# Patient Record
Sex: MALE | Race: WHITE | NOT HISPANIC OR LATINO | URBAN - METROPOLITAN AREA
[De-identification: names, ages, dates, MRNs, and addresses within clinical notes are randomized per-mention and may not be internally consistent; named-entity substitution may affect disease eponyms.]

---

## 2020-10-08 ENCOUNTER — EMERGENCY (EMERGENCY)
Facility: HOSPITAL | Age: 35
LOS: 1 days | Discharge: HOME | End: 2020-10-08
Attending: EMERGENCY MEDICINE | Admitting: EMERGENCY MEDICINE
Payer: MEDICAID

## 2020-10-08 VITALS
TEMPERATURE: 98 F | RESPIRATION RATE: 14 BRPM | HEART RATE: 106 BPM | SYSTOLIC BLOOD PRESSURE: 125 MMHG | DIASTOLIC BLOOD PRESSURE: 75 MMHG | OXYGEN SATURATION: 100 %

## 2020-10-08 LAB
ALBUMIN SERPL ELPH-MCNC: 4.2 G/DL — SIGNIFICANT CHANGE UP (ref 3.3–5)
ALP SERPL-CCNC: 92 U/L — SIGNIFICANT CHANGE UP (ref 40–120)
ALT FLD-CCNC: 44 U/L — HIGH (ref 4–41)
ANION GAP SERPL CALC-SCNC: 10 MMO/L — SIGNIFICANT CHANGE UP (ref 7–14)
AST SERPL-CCNC: 23 U/L — SIGNIFICANT CHANGE UP (ref 4–40)
BASOPHILS # BLD AUTO: 0.03 K/UL — SIGNIFICANT CHANGE UP (ref 0–0.2)
BASOPHILS NFR BLD AUTO: 0.3 % — SIGNIFICANT CHANGE UP (ref 0–2)
BILIRUB SERPL-MCNC: 0.6 MG/DL — SIGNIFICANT CHANGE UP (ref 0.2–1.2)
BUN SERPL-MCNC: 11 MG/DL — SIGNIFICANT CHANGE UP (ref 7–23)
CALCIUM SERPL-MCNC: 9.6 MG/DL — SIGNIFICANT CHANGE UP (ref 8.4–10.5)
CHLORIDE SERPL-SCNC: 101 MMOL/L — SIGNIFICANT CHANGE UP (ref 98–107)
CO2 SERPL-SCNC: 26 MMOL/L — SIGNIFICANT CHANGE UP (ref 22–31)
CREAT SERPL-MCNC: 0.74 MG/DL — SIGNIFICANT CHANGE UP (ref 0.5–1.3)
EOSINOPHIL # BLD AUTO: 0.4 K/UL — SIGNIFICANT CHANGE UP (ref 0–0.5)
EOSINOPHIL NFR BLD AUTO: 4.1 % — SIGNIFICANT CHANGE UP (ref 0–6)
GLUCOSE SERPL-MCNC: 98 MG/DL — SIGNIFICANT CHANGE UP (ref 70–99)
HCT VFR BLD CALC: 38.3 % — LOW (ref 39–50)
HGB BLD-MCNC: 12.3 G/DL — LOW (ref 13–17)
IMM GRANULOCYTES NFR BLD AUTO: 0.8 % — SIGNIFICANT CHANGE UP (ref 0–1.5)
LYMPHOCYTES # BLD AUTO: 2.25 K/UL — SIGNIFICANT CHANGE UP (ref 1–3.3)
LYMPHOCYTES # BLD AUTO: 23.1 % — SIGNIFICANT CHANGE UP (ref 13–44)
MCHC RBC-ENTMCNC: 28.5 PG — SIGNIFICANT CHANGE UP (ref 27–34)
MCHC RBC-ENTMCNC: 32.1 % — SIGNIFICANT CHANGE UP (ref 32–36)
MCV RBC AUTO: 88.7 FL — SIGNIFICANT CHANGE UP (ref 80–100)
MONOCYTES # BLD AUTO: 0.89 K/UL — SIGNIFICANT CHANGE UP (ref 0–0.9)
MONOCYTES NFR BLD AUTO: 9.1 % — SIGNIFICANT CHANGE UP (ref 2–14)
NEUTROPHILS # BLD AUTO: 6.1 K/UL — SIGNIFICANT CHANGE UP (ref 1.8–7.4)
NEUTROPHILS NFR BLD AUTO: 62.6 % — SIGNIFICANT CHANGE UP (ref 43–77)
NRBC # FLD: 0 K/UL — SIGNIFICANT CHANGE UP (ref 0–0)
PLATELET # BLD AUTO: 251 K/UL — SIGNIFICANT CHANGE UP (ref 150–400)
PMV BLD: 11.3 FL — SIGNIFICANT CHANGE UP (ref 7–13)
POTASSIUM SERPL-MCNC: 3.8 MMOL/L — SIGNIFICANT CHANGE UP (ref 3.5–5.3)
POTASSIUM SERPL-SCNC: 3.8 MMOL/L — SIGNIFICANT CHANGE UP (ref 3.5–5.3)
PROT SERPL-MCNC: 8 G/DL — SIGNIFICANT CHANGE UP (ref 6–8.3)
RBC # BLD: 4.32 M/UL — SIGNIFICANT CHANGE UP (ref 4.2–5.8)
RBC # FLD: 12.6 % — SIGNIFICANT CHANGE UP (ref 10.3–14.5)
SARS-COV-2 RNA SPEC QL NAA+PROBE: SIGNIFICANT CHANGE UP
SODIUM SERPL-SCNC: 137 MMOL/L — SIGNIFICANT CHANGE UP (ref 135–145)
T3 SERPL-MCNC: 162.7 NG/DL — SIGNIFICANT CHANGE UP (ref 80–200)
T4 AB SER-ACNC: 12.18 UG/DL — SIGNIFICANT CHANGE UP (ref 5.1–13)
T4 FREE SERPL-MCNC: 3 NG/DL — HIGH (ref 0.9–1.8)
TSH SERPL-MCNC: < 0.1 UIU/ML — LOW (ref 0.27–4.2)
WBC # BLD: 9.75 K/UL — SIGNIFICANT CHANGE UP (ref 3.8–10.5)
WBC # FLD AUTO: 9.75 K/UL — SIGNIFICANT CHANGE UP (ref 3.8–10.5)

## 2020-10-08 PROCEDURE — 99219: CPT

## 2020-10-08 PROCEDURE — 76536 US EXAM OF HEAD AND NECK: CPT | Mod: 26

## 2020-10-08 PROCEDURE — 71046 X-RAY EXAM CHEST 2 VIEWS: CPT | Mod: 26

## 2020-10-08 PROCEDURE — 99218: CPT

## 2020-10-08 RX ORDER — KETOROLAC TROMETHAMINE 30 MG/ML
30 SYRINGE (ML) INJECTION EVERY 6 HOURS
Refills: 0 | Status: DISCONTINUED | OUTPATIENT
Start: 2020-10-08 | End: 2020-10-09

## 2020-10-08 RX ORDER — KETOROLAC TROMETHAMINE 30 MG/ML
15 SYRINGE (ML) INJECTION ONCE
Refills: 0 | Status: DISCONTINUED | OUTPATIENT
Start: 2020-10-08 | End: 2020-10-08

## 2020-10-08 RX ADMIN — Medication 15 MILLIGRAM(S): at 17:01

## 2020-10-08 NOTE — ED CDU PROVIDER INITIAL DAY NOTE - ATTENDING CONTRIBUTION TO CARE
Seen and examined, have discussed plan of care with PA.   I agree with note as stated above, having amended the EMR as needed to reflect the findings. Likely thyroiditis, tx sx, await further lab testing, monitor HR, consider methimazole for overnight sx, will discuss with Endocrine.

## 2020-10-08 NOTE — ED CDU PROVIDER INITIAL DAY NOTE - MEDICAL DECISION MAKING DETAILS
34 y/o male c/o right side throat pain, ttp and right thyroid nodule on US  -concern for thyroiditis vs malignancy etc  -labs, observation, pain control  -endocrinology evaluation

## 2020-10-08 NOTE — ED ADULT TRIAGE NOTE - CHIEF COMPLAINT QUOTE
Pt presents to ED for throat pain since yesterday, pt states one month ago in [facility] he was told he had 2 tumors in his throat and yesterday he was diagnosed with 4 tumors in his throat. C/o pain when swallowing, no difficulty breathing  or SOB at this time, airway patent, able to speak in full complete sentences w/o difficulty, SpO2 100% at this time. Denies pmhx.

## 2020-10-08 NOTE — ED CDU PROVIDER INITIAL DAY NOTE - OBJECTIVE STATEMENT
Pt is a 36 y/o M no PMHx p/w neck pain x 2 months.  Pt notes constant bilateral anterior neck pain, sharp, which worsens with swallowing and palpation. He states he had sono of thyroid 1-2 months ago in [facility] and was told he had two nodules at thyroid, for which pt states he was treated with antibiotics and steroids.  He notes when he took steroids, pain would improve.  Yesterday he had thyroid sono through PCP Dr. Hays.  He was told by the Catapultero tech that he may have four nodules.  He presents to ED today due to continued pain.  Pt states he is able to swallow liquids and solids, but with pain.  Pt denies any fevers, chills, chest pain, SOB, palpitations, headache.    CDU Note: Agree with above: 36 y/o male w ?hx thyroid infection/nodule diagnosed in [facility] months ago now c/o worsening pain to right side of neck. In ER had US showin luis thyroid nodule 1.9 cm and tsh <0.10 (t3/t4 wnl) - sent to CDU for pain control, observation and endocrinology evaluation am. Pt. resting comfortably.

## 2020-10-08 NOTE — ED CDU PROVIDER INITIAL DAY NOTE - DETAILS
36 y/o male c/o right side throat pain, ttp and right thyroid nodule on US  -concern for thyroiditis vs malignancy etc  -labs, observation, pain control  -endocrinology evaluation

## 2020-10-08 NOTE — ED PROVIDER NOTE - OBJECTIVE STATEMENT
Pt is a 36 y/o M no PMHx p/w neck pain x 2 months.  Pt notes constant bilateral anterior neck pain, sharp, which worsens with swallowing and palpation. He states he had sono of thyroid 1-2 months ago in [facility] and was told he had two nodules at thyroid, for which pt states he was treated with antibiotics and steroids.  He notes when he took steroids, pain would improve.  Yesterday he had thyroid sono through PCP Dr. Hays.  He was told by the [employer] tech that he may have four nodules.  He presents to ED today due to continued pain.  Pt states he is able to swallow liquids and solids, but with pain.  Pt denies any fevers, chills, chest pain, SOB, palpitations, headache.

## 2020-10-08 NOTE — CHART NOTE - NSCHARTNOTEFT_GEN_A_CORE
34 y/o M w/ hx of ?thyroid infection/inflammation ?months ago. Poor historian according to team. Now presenting with throat pain. Thyroid US reveals   TSH <0.1, TT3 162, TT4 12.18.   HR 97, rest of vitals wnl.   < from: US Thyroid (10.08.20 @ 15:09) >    The thyroid gland is mildly heterogeneous in echogenicity.    Right Lobe: Enlarged measuring 4.1 x 2.2 x 2.1 cm. Solid isoechoic ill-defined noncalcified nodule in the mid to lower pole measures 1.6 x 1.6 x 1.9 cm.    Left Lobe: 2.9 x 1.3 x 0.9 cm. no discrete thyroid nodules are identified.    Isthmus: 4 mm.    Cervical Lymph Nodes: No enlarged or abnormal morphology cervical nodes.    IMPRESSION:    Solid right thyroid nodule measuring up to 1.9 cm.    TI-RAD 3: Mildly suspicious (FNA if > 2.5 cm, Follow if > 1.5 cm)    Suspect recovering from thyroiditis and currently euthyroid given history or subclinical hyperthyroidism from toxic nodule/graves' disease. TSH takes up to 4-6 weeks to full equilibrate while T3 and T4 can equilibrate in a week.  -please check FT4, TSI, TSH receptor ab, TPO ab and thyroglobulin   -would need uptake scan outpatient if antibodies are negative   -would not start methimazole or beta blockers at this point in time but may reconsider if FT4 is elevated or HR is elevated.

## 2020-10-08 NOTE — ED PROVIDER NOTE - ATTENDING CONTRIBUTION TO CARE
yumiko: 36 yo man co painful neck swelling which on exam is a goiter, has nodule on sono.  pt otherwise pleasant, alert and interactive. the thyroid is diffusely swollen and tender. pain has been relieved by intermittent steroids.   pt would benefit from cdu for observation and endocrine eval tomorrow    I performed a history and physical exam of the patient and discussed their management with the resident and /or advanced care provider. I reviewed the resident and /or ACP's note and agree with the documented findings and plan of care. My medical decison making and observations are found above.

## 2020-10-08 NOTE — ED ADULT NURSE NOTE - CHIEF COMPLAINT QUOTE
Pt presents to ED for throat pain since yesterday, pt states one month ago in [facility] he was told he had 2 tumors in his throat and yesterday he was diagnosed with 4 tumors in his throat. C/o pain when swallowing, no difficulty breathing  or SOB at this time, airway patent, able to speak in full complete sentences w/o difficulty, SpO2 100% at this time. Denies pmhx.

## 2020-10-08 NOTE — ED PROVIDER NOTE - CLINICAL SUMMARY MEDICAL DECISION MAKING FREE TEXT BOX
Pt is a 36 y/o M no PMHx p/w neck pain x 2 months -- possible thyroiditis, no e/o tonsillitis or PTA -- labs, thyroid panel Pt is a 34 y/o M no PMHx p/w neck pain x 2 months -- possible thyroiditis, no e/o tonsillitis or PTA -- labs, thyroid panel    maryellenkaity: 34 yo man co painful neck swelling which on exam is a goiter, has nodule on sono.  pt otherwise pleasant, alert and interactive. the thyroid is diffusely swollen and tender. pain has been relieved by intermittent steroids.   pt would benefit from cdu for observation and endocrine eval tomorrow

## 2020-10-09 VITALS
RESPIRATION RATE: 16 BRPM | OXYGEN SATURATION: 100 % | HEART RATE: 87 BPM | SYSTOLIC BLOOD PRESSURE: 111 MMHG | DIASTOLIC BLOOD PRESSURE: 71 MMHG | TEMPERATURE: 99 F

## 2020-10-09 DIAGNOSIS — E04.1 NONTOXIC SINGLE THYROID NODULE: ICD-10-CM

## 2020-10-09 LAB — THYROPEROXIDASE AB SERPL-ACNC: 21.5 IU/ML — SIGNIFICANT CHANGE UP

## 2020-10-09 PROCEDURE — 99217: CPT

## 2020-10-09 RX ORDER — ATENOLOL 25 MG/1
0.5 TABLET ORAL
Qty: 15 | Refills: 0
Start: 2020-10-09 | End: 2020-11-07

## 2020-10-09 RX ADMIN — Medication 30 MILLIGRAM(S): at 10:30

## 2020-10-09 RX ADMIN — Medication 30 MILLIGRAM(S): at 09:40

## 2020-10-09 NOTE — ED CDU PROVIDER DISPOSITION NOTE - CLINICAL COURSE
36 yo male, no stated PMH other than recently diagnosed "thyroid nodules" in [place], presented to the ED c/o bilateral anterior neck pain, worsened with swallowing and direct palpation, x 2 months. Evaluated by Endocrinology recommend outpatient work up, short course of medrol dose sharon and atenol. PT agreeable to plan. Will follow up with clinic in 2 weeks. Return precautions provided.

## 2020-10-09 NOTE — ED CDU PROVIDER SUBSEQUENT DAY NOTE - ATTENDING CONTRIBUTION TO CARE
I performed a history and physical exam of the patient and discussed their management with the PA. I reviewed the PA's note and agree with the documented findings and plan of care. I have edited as appropriate. My medical decision making and observations are found above.  NAD, normal speech, ate breakfast w/o issue, no airway compromise

## 2020-10-09 NOTE — CONSULT NOTE ADULT - PROBLEM SELECTOR RECOMMENDATION 2
-Will have to rule out toxic nodule outpatient with I-131 uptake and scan   -If negative, will schedule for FNA of the right mid pole solid, isoechoic nodule with a thick halo 1.9 cm meets CORI critieria for biopsy

## 2020-10-09 NOTE — CONSULT NOTE ADULT - SUBJECTIVE AND OBJECTIVE BOX
HPI:  35 year old male with history of neck pain x 2 months.   Patient reported that 2 months ago he was in [facility]. He notices that he had constant anterior neck pain, sharp and worsened with swallowing and palpation. He saw multiple providers in [facility] and was given antibiotics and steroids ( multiple times). He also had a neck ultrasound there and was found to have bilateral thyroid nodules.   He was seen yesterday by his PCP Dr. Hays, who also ordered an ultrasound showing 4 nodules. Subsequently he came to the ED because he has been having continued pain.   He denied having heart palpitations, tremors. He reported some weight loss.   Found to have a TSH of 0.1 and FT4 of 3.0    Thyroid ultrasound showing:   The thyroid gland is mildly heterogeneous in echogenicity.   Right Lobe: Enlarged measuring 4.1 x 2.2 x 2.1 cm. Solid isoechoic ill-defined noncalcified nodule in the mid to lower pole measures 1.6 x 1.6 x 1.9 cm.   Left Lobe: 2.9 x 1.3 x 0.9 cm. No discrete thyroid nodules are identified.   Isthmus: 4 mm.   Cervical Lymph Nodes: No enlarged or abnormal morphology cervical nodes.   IMPRESSION:     Solid right thyroid nodule measuring up to 1.9 cm.         PAST MEDICAL & SURGICAL HISTORY:  Thyroid nodule    No significant past surgical history        FAMILY HISTORY:  No pertinent family history in first degree relatives        SOCIAL HISTORY:  Smoking:None   ETOH:None       MEDICATIONS  (PRN):  ketorolac   Injectable 30 milliGRAM(s) IV Push every 6 hours PRN mild to moderate pain      Allergies  No Known Allergies          REVIEW OF SYSTEMS  CONSTITUTIONAL:  Negative fever or chills  EYES:  Negative visual field deficit, blurry vision or double vision  ENT:  + sore throat +Tenderness on swallowing   CARDIOVASCULAR:  Negative for chest pain or palpitations  RESPIRATORY:  Negative for cough, wheezing, sputum production, or SOB   GASTROINTESTINAL:  Negative for nausea, vomiting, diarrhea, or abdominal pain  GENITOURINARY:  Negative frequency, urgency or dysuria  MUSCULOSKELETAL:  No joint pain or stiffness  INTEGUMENTARY: no rashes  NEUROLOGIC:  No headache, confusion, syncope or seizures  PSYCHIATRIC: No depression or anxiety          LABS:                        12.3   9.75  )-----------( 251      ( 08 Oct 2020 11:17 )             38.3     10-08    137  |  101  |  11  ----------------------------<  98  3.8   |  26  |  0.74    Ca    9.6      08 Oct 2020 11:17    TPro  8.0  /  Alb  4.2  /  TBili  0.6  /  DBili  x   /  AST  23  /  ALT  44<H>  /  AlkPhos  92  10-08          RADIOLOGY & ADDITIONAL STUDIES:      Physical Examination:  Vital Signs Last 24 Hrs  T(C): 37 (09 Oct 2020 10:21), Max: 37.1 (08 Oct 2020 19:15)  T(F): 98.6 (09 Oct 2020 10:21), Max: 98.7 (08 Oct 2020 19:15)  HR: 87 (09 Oct 2020 10:21) (85 - 97)  BP: 111/71 (09 Oct 2020 10:21) (105/71 - 116/72)  BP(mean): --  RR: 16 (09 Oct 2020 10:21) (16 - 16)  SpO2: 100% (09 Oct 2020 10:21) (100% - 100%)    Constitutional: wn/wd in NAD.   HEENT: NCAT, MMM  Neck: +Boggy thyroid with pain on palpation   Respiratory: lungs CTAB.  Cardiovascular: S1, S2, mild tachycardia   GI: soft, NT/ND, no masses/HSM appreciated.  Neurology: no tremors.   Skin: no visible rashes/lesions  Psychiatric: AAO x 3, normal affect/mood.

## 2020-10-09 NOTE — ED CDU PROVIDER SUBSEQUENT DAY NOTE - HISTORY
36 yo female, no stated PMH other than recently diagnosed "thyroid nodules" in [place], presented to the ED c/o bilateral anterior neck pain, worsened with swallowing and direct palpation, x 2 months.  Pt had outpatient thyroid US by his PMD 2 days ago which reportedly showed multiple nodules; pt was having pain, so presented to the ED for further evaluation.    In the ED, VSS; WBC 9.75, Hb 12.3, CMP: ALT 44; CMP otherwise unremarkable.  TSH <0.10, T3 total: 162.7, T4: 12.18, free thyroxine: 3.  Endocrine was consulted; will evaluate pt 10/9 daytime.  Suspect thyroiditis, pt was dispo'd to CDU for continued care plan:  Supportive care, Endocrine consult, general observation care / monitoring.  In the interim, pt objectively noted to be resting comfortably; no issues thus far.

## 2020-10-09 NOTE — ED CDU PROVIDER DISPOSITION NOTE - PATIENT PORTAL LINK FT
You can access the FollowMyHealth Patient Portal offered by [facility] by registering at the following website: [email or web address]. By joining [facility]’s FollowMyHealth portal, you will also be able to view your health information using other applications (apps) compatible with our system.

## 2020-10-09 NOTE — CONSULT NOTE ADULT - PROBLEM SELECTOR RECOMMENDATION 9
-Likely thyroiditis vs. hot nodule  -Will check TSHrab, TSI to rule out Graves disease  -Will need uptake and scan (thyroid) outpatient   -Start Atenolol 12.5 mg daily   -Discharge on Medrol dose pack   -NSAIDs as needed for pain   -Will be requiring close outpatient follow up with Endocrinology at [place] within 2-3 weeks ([phone])

## 2020-10-09 NOTE — ED CDU PROVIDER SUBSEQUENT DAY NOTE - PHYSICAL EXAMINATION
CONSTITUTIONAL:  Well appearing, awake, alert, oriented to person, place, time/situation and in no apparent distress.  Pt. is objectively comfortable appearing and verbalizing in full, clear, effortless sentences.  ENMT: NC/AT.  Airway patent.  Nasal mucosa clear.  Moist mucous membranes.  Neck supple.  Subjective TTP overlying thyroid.  EYES:  Clear OU.  CARDIAC:  Normal rate, regular rhythm.  Heart sounds S1 S2.  No murmurs, gallops, or rubs.  RESPIRATORY:  Breath sounds clear and equal bilaterally.  No wheezes, no rales, no rhonchi.  GASTROINTESTINAL:  Abdomen soft, non-distended, non-tender.  No rebound, no guarding.  MUSCULOSKELETAL:  Range of motion is not limited.    SKIN:  Skin color unremarkable.  Skin warm, dry, and intact.    PSYCHIATRIC:  Alert and oriented to person/place/time/situation.  Mood and affect WNL.  No apparent risk to self or others.

## 2020-10-09 NOTE — ED CDU PROVIDER DISPOSITION NOTE - NSFOLLOWUPINSTRUCTIONS_ED_ALL_ED_FT
1) Please take half tab of Atenolol daily. Take daily steroid for further pain relief. Please follow up with endocrinology clinic contact @ [phone] in 2 weeks  2) Please continue to take any home medications as prescribed. Take Tylenol 325 mg every 4 hours for pain relief/fever control or ibuprofen 600 mg every 6 hours for pain relief/fever control  3) Your test results from your ED visit were discussed with you prior to discharge  4) You were provided with a copy of your test results

## 2020-10-09 NOTE — CONSULT NOTE ADULT - ASSESSMENT
35y old male who presents with a chief complaint of throat pain found to have hyperthyroidism and a dominant right sided thyroid nodule.  At this time, given his anterior neck tenderness this appears to be subacute thyroiditis ( painful) but will need to rule out a toxic nodule outpatient.   Will require beta blockade, NSAIDs and steroids

## 2020-10-12 LAB — TSI ACT/NOR SER: <0.1 IU/L — SIGNIFICANT CHANGE UP (ref 0–0.55)

## 2020-10-13 LAB — TSH RECEP AB FLD-ACNC: <1.1 IU/L — SIGNIFICANT CHANGE UP (ref 0–1.75)

## 2020-10-28 PROBLEM — E04.1 NONTOXIC SINGLE THYROID NODULE: Chronic | Status: ACTIVE | Noted: 2020-10-09

## 2020-11-11 ENCOUNTER — APPOINTMENT (OUTPATIENT)
Dept: ENDOCRINOLOGY | Facility: CLINIC | Age: 35
End: 2020-11-11
Payer: MEDICAID

## 2020-11-11 VITALS
DIASTOLIC BLOOD PRESSURE: 70 MMHG | HEART RATE: 71 BPM | OXYGEN SATURATION: 98 % | TEMPERATURE: 97.7 F | HEIGHT: 62 IN | WEIGHT: 144 LBS | SYSTOLIC BLOOD PRESSURE: 100 MMHG | BODY MASS INDEX: 26.5 KG/M2

## 2020-11-11 DIAGNOSIS — Z83.49 FAMILY HISTORY OF OTHER ENDOCRINE, NUTRITIONAL AND METABOLIC DISEASES: ICD-10-CM

## 2020-11-11 PROCEDURE — 99072 ADDL SUPL MATRL&STAF TM PHE: CPT

## 2020-11-11 PROCEDURE — 99215 OFFICE O/P EST HI 40 MIN: CPT | Mod: 25

## 2020-11-13 LAB
ALBUMIN SERPL ELPH-MCNC: 4.4 G/DL
ALP BLD-CCNC: 70 U/L
ALT SERPL-CCNC: 34 U/L
ANION GAP SERPL CALC-SCNC: 14 MMOL/L
AST SERPL-CCNC: 28 U/L
BASOPHILS # BLD AUTO: 0.02 K/UL
BASOPHILS NFR BLD AUTO: 0.4 %
BILIRUB SERPL-MCNC: 0.5 MG/DL
BUN SERPL-MCNC: 7 MG/DL
CALCIUM SERPL-MCNC: 9.2 MG/DL
CHLORIDE SERPL-SCNC: 103 MMOL/L
CO2 SERPL-SCNC: 23 MMOL/L
CREAT SERPL-MCNC: 0.84 MG/DL
EOSINOPHIL # BLD AUTO: 0.35 K/UL
EOSINOPHIL NFR BLD AUTO: 7.3 %
GLUCOSE SERPL-MCNC: 82 MG/DL
HCT VFR BLD CALC: 41.2 %
HGB BLD-MCNC: 13.1 G/DL
IMM GRANULOCYTES NFR BLD AUTO: 0.2 %
LYMPHOCYTES # BLD AUTO: 2.23 K/UL
LYMPHOCYTES NFR BLD AUTO: 46.3 %
MAN DIFF?: NORMAL
MCHC RBC-ENTMCNC: 29 PG
MCHC RBC-ENTMCNC: 31.8 GM/DL
MCV RBC AUTO: 91.4 FL
MONOCYTES # BLD AUTO: 0.24 K/UL
MONOCYTES NFR BLD AUTO: 5 %
NEUTROPHILS # BLD AUTO: 1.97 K/UL
NEUTROPHILS NFR BLD AUTO: 40.8 %
PLATELET # BLD AUTO: 229 K/UL
POTASSIUM SERPL-SCNC: 4.3 MMOL/L
PROT SERPL-MCNC: 6.6 G/DL
RBC # BLD: 4.51 M/UL
RBC # FLD: 14.2 %
SODIUM SERPL-SCNC: 139 MMOL/L
T3 SERPL-MCNC: 58 NG/DL
T4 FREE SERPL-MCNC: 0.5 NG/DL
TSH SERPL-ACNC: 15 UIU/ML
WBC # FLD AUTO: 4.82 K/UL

## 2020-12-29 ENCOUNTER — APPOINTMENT (OUTPATIENT)
Dept: ENDOCRINOLOGY | Facility: CLINIC | Age: 35
End: 2020-12-29
Payer: MEDICAID

## 2020-12-29 VITALS
SYSTOLIC BLOOD PRESSURE: 110 MMHG | OXYGEN SATURATION: 98 % | WEIGHT: 139 LBS | DIASTOLIC BLOOD PRESSURE: 70 MMHG | TEMPERATURE: 98.2 F | HEIGHT: 62 IN | BODY MASS INDEX: 25.58 KG/M2 | HEART RATE: 63 BPM

## 2020-12-29 PROCEDURE — 99072 ADDL SUPL MATRL&STAF TM PHE: CPT

## 2020-12-29 PROCEDURE — 76536 US EXAM OF HEAD AND NECK: CPT | Mod: 52

## 2020-12-29 PROCEDURE — 99214 OFFICE O/P EST MOD 30 MIN: CPT | Mod: 25

## 2020-12-29 NOTE — HISTORY OF PRESENT ILLNESS
[FreeTextEntry1] : History of Present Illness\par ========================================================================================\par 35 year old male with hx of thyroid nodule and hyperthyroidism here for follow up visit for hyperthyroidism. \par Patient reported that 2 months ago he was in [facility]. He notices that he had\par constant anterior neck pain, sharp and worsened with swallowing and palpation.\par He saw multiple providers in [facility] and was given antibiotics and steroids\par ( multiple times). He also had a neck ultrasound there and was found to have\par bilateral thyroid nodules.\par [place]ame [place] the [place] and continued to have pain. He went to ED and was found to have a TSH of 0.1 and FT4 of 3.0. He was found to have Solid isoechoic ill-defined noncalcified nodule in the mid to lower pole measures 1.6 x 1.6 x 1.9 cm on thyroid US. \par TSI and TSH receptor ab negative. TPO and Tg ab negative. Covid ab test negative. \par Discharged on atenolol 12.5 mg qday. He used it for a few days but after neck pain resolved, he stopped using it. \par Occasional palpitations. No chest pain or sob. No weight changes, bowel changes, temperature intolerance, or tremors. No hair or skin changes. \par No neck pain, dysphagia, or dysphonia. \par \par \par Thyroid ultrasound showing:\par The thyroid gland is mildly heterogeneous in echogenicity.\par Right Lobe: Enlarged measuring 4.1 x 2.2 x 2.1 cm. Solid isoechoic ill-defined\par noncalcified nodule in the mid to lower pole measures 1.6 x 1.6 x 1.9 cm.\par Left Lobe: 2.9 x 1.3 x 0.9 cm. No discrete thyroid nodules are identified.\par Isthmus: 4 mm.\par Cervical Lymph Nodes: No enlarged or abnormal morphology cervical nodes.\par IMPRESSION:\par \par Solid right thyroid nodule measuring up to 1.9 cm.\par \par \par In the interim, \par \par Noted to have TFTs consistent with hypothyroidism. Initiated on Levothyroxine 75 mcg daily \par No overt hypothyroid symptoms at this time \par He reported having sexual dysfunction such as erectile dysfunction frequently \par

## 2020-12-29 NOTE — HISTORY OF PRESENT ILLNESS
[FreeTextEntry1] : History of Present Illness\par ========================================================================================\par 35 year old male with hx of thyroid nodule and hyperthyroidism here for follow up visit for hyperthyroidism. \par Patient reported that 2 months ago he was in [facility]. He notices that he had\par constant anterior neck pain, sharp and worsened with swallowing and palpation.\par He saw multiple providers in [facility] and was given antibiotics and steroids\par ( multiple times). He also had a neck ultrasound there and was found to have\par bilateral thyroid nodules.\par Came to the [place] and continued to have pain. He went to ED and was found to have a TSH of 0.1 and FT4 of 3.0. He was found to have Solid isoechoic ill-defined noncalcified nodule in the mid to lower pole measures 1.6 x 1.6 x 1.9 cm on thyroid US. \par TSI and TSH receptor ab negative. TPO and Tg ab negative. Covid ab test negative. \par Discharged on atenolol 12.5 mg qday. He used it for a few days but after neck pain resolved, he stopped using it. \par Occasional palpitations. No chest pain or sob. No weight changes, bowel changes, temperature intolerance, or tremors. No hair or skin changes. \par No neck pain, dysphagia, or dysphonia. \par \par \par Thyroid ultrasound showing:\par The thyroid gland is mildly heterogeneous in echogenicity.\par Right Lobe: Enlarged measuring 4.1 x 2.2 x 2.1 cm. Solid isoechoic ill-defined\par noncalcified nodule in the mid to lower pole measures 1.6 x 1.6 x 1.9 cm.\par Left Lobe: 2.9 x 1.3 x 0.9 cm. No discrete thyroid nodules are identified.\par Isthmus: 4 mm.\par Cervical Lymph Nodes: No enlarged or abnormal morphology cervical nodes.\par IMPRESSION:\par \par Solid right thyroid nodule measuring up to 1.9 cm.\par \par \par In the interim, \par \par Noted to have TFTs consistent with hypothyroidism. Initiated on Levothyroxine 75 mcg daily \par No overt hypothyroid symptoms at this time \par He reported having sexual dysfunction such as erectile dysfunction frequently \par

## 2020-12-29 NOTE — PROCEDURE
[Fine Needle Aspiration] : Fine needle aspiration ~T ~C was performed. [Area of Mass: ______] : mass identified in the [unfilled] [Risks] : risks [Patient] : the patient [Benefits] : benefits [Supine] : The patient was placed in the supine position with the neck extended as tolerated. [Alcohol] : with alcohol [25 gauge 1.5 inch] : A 25 gauge 1.5 inch needle was used [3 Passes] : 3 passes were made through the mass [Ultrasonic Guidance] : ultrasound guidance was employed [Sent to Histology] : The specimens were prepared in the usual manner and sent to histology. [Afirma] : Afirma [Vital Signs Stable] : the vital signs were stable [No Complications] : There were no complications [FreeTextEntry1] : 35 year old male with history of thyroid nodule in the right middle pole 1.9 cm s/p FNA \par \par -Saved for affirma \par -Will be called back with results \par -Follow up as per results

## 2020-12-29 NOTE — ASSESSMENT
[FreeTextEntry1] : \par 35 year old male with hx of thyroid nodule and hyperthyroidism here for visit for hypothyroidism and thyroid nodule \par \par #Hypothyroidism \par -Now clinically euthyroid\par -Check TFTs \par -Continue Levothyroxine 75 mcg daily for now, will adjust as necessary \par \par #thyroid nodule- suspect subacute thyroiditis given hx of neck pain vs toxic nodule. Now off atenolol with HR at goal. Thyroid US revealed right Solid isoechoic ill-defined noncalcified nodule in the mid to lower pole measures 1.6 x 1.6 x 1.9 cm. \par -Patient came in for an FNA but noted to have a much smaller nodule in the right lower pole today \par 0.78 x 0.68 x 0.6 cm, and it does not meet FNA criteria at this time\par \par Sexual dysfunction\par -Will check LH, FSH, testosterone and prolactin levels \par \par RTC in 6 months

## 2020-12-29 NOTE — REVIEW OF SYSTEMS
[Fatigue] : no fatigue [Decreased Appetite] : appetite not decreased [Recent Weight Gain (___ Lbs)] : no recent weight gain [Recent Weight Loss (___ Lbs)] : no recent weight loss [Visual Field Defect] : no visual field defect [Dry Eyes] : no dryness [Dysphagia] : no dysphagia [Neck Pain] : no neck pain [Dysphonia] : no dysphonia [Nasal Congestion] : no nasal congestion [Chest Pain] : no chest pain [Slow Heart Rate] : heart rate is not slow [Palpitations] : no palpitations [Fast Heart Rate] : heart rate is not fast [Shortness Of Breath] : no shortness of breath [Cough] : no cough [Nausea] : no nausea [Constipation] : no constipation [Vomiting] : no vomiting [Diarrhea] : no diarrhea [Polyuria] : no polyuria [Hesistancy] : no hesitancy [Joint Pain] : no joint pain [Muscle Weakness] : no muscle weakness [Acanthosis] : no acanthosis  [Headaches] : no headaches [Dizziness] : no dizziness [Tremors] : no tremors [Pain/Numbness of Digits] : no pain/numbness of digits [Depression] : no depression [Polydipsia] : no polydipsia [Cold Intolerance] : no cold intolerance [Easy Bleeding] : no ~M tendency for easy bleeding [Easy Bruising] : no tendency for easy bruising

## 2021-01-04 LAB
FSH SERPL-MCNC: 2.2 IU/L
LH SERPL-ACNC: 5.4 IU/L
PROLACTIN SERPL-MCNC: 9.7 NG/ML
T4 FREE SERPL-MCNC: 1.7 NG/DL
TESTOST BND SERPL-MCNC: 10.6 PG/ML
TESTOST SERPL-MCNC: 872.9 NG/DL
TSH SERPL-ACNC: 1.06 UIU/ML

## 2021-01-08 NOTE — ED ADULT NURSE NOTE - OBJECTIVE STATEMENT
pt to ED curtain 27, a/ox4, ambulatory with steady gait c/o throat pain x2 months. Pt from [facility] and was told two months ago he had 2 tumors in his throat. He went to his PCP yesterday, where they told him he had 4 more tumors in his throat. pt states he has pain when he swallows, denies any other pain. Airway in tact, speaking in full sentences, breathing unlabored, denies SOB, %. Denies cp, n/v/d/ fever, chills, dizziness, skin in tact. Bed in lowest position, pt in  no acute distress, will continue to assess.
0

## 2021-02-22 ENCOUNTER — APPOINTMENT (OUTPATIENT)
Dept: UROLOGY | Facility: HOSPITAL | Age: 36
End: 2021-02-22

## 2021-02-22 ENCOUNTER — OUTPATIENT (OUTPATIENT)
Dept: OUTPATIENT SERVICES | Facility: HOSPITAL | Age: 36
LOS: 1 days | End: 2021-02-22
Payer: MEDICAID

## 2021-02-22 VITALS
BODY MASS INDEX: 25.21 KG/M2 | WEIGHT: 137 LBS | DIASTOLIC BLOOD PRESSURE: 88 MMHG | TEMPERATURE: 97.2 F | RESPIRATION RATE: 14 BRPM | HEART RATE: 56 BPM | SYSTOLIC BLOOD PRESSURE: 122 MMHG | HEIGHT: 62 IN

## 2021-02-22 DIAGNOSIS — N52.9 MALE ERECTILE DYSFUNCTION, UNSPECIFIED: ICD-10-CM

## 2021-02-22 DIAGNOSIS — N47.1 PHIMOSIS: ICD-10-CM

## 2021-02-22 DIAGNOSIS — R30.0 DYSURIA: ICD-10-CM

## 2021-02-22 DIAGNOSIS — N48.1 BALANITIS: ICD-10-CM

## 2021-02-22 PROCEDURE — G0463: CPT

## 2021-02-22 RX ORDER — NYSTATIN 100000 [USP'U]/G
100000 CREAM TOPICAL 3 TIMES DAILY
Qty: 1 | Refills: 1 | Status: ACTIVE | COMMUNITY
Start: 2021-02-22 | End: 1900-01-01

## 2021-02-23 NOTE — HISTORY OF PRESENT ILLNESS
[FreeTextEntry1] : 35 year old man presents with penile discomfort and erectile dysfunction. Reports pain under glans of penis and foreskin for 2 weeks, worsened when comes in contact with water, no alleviating factors. He is concerned he may have fungal infection as he had fungal infections a few years ago, treated with anti-fungal topical agent. He saw a urologist 2 weeks ago and was told to apply triamcinolone and nystatin to affected area BID, however he developed significant burning and saw dermatologist, who recommended dc triamcinolone, which did improve the burning. He continues to use nystatin. Overall, no change in past 2 weeks. Occasionally has difficulty retracting foreskin due to pain. No pain in testicles, no dysuria, no hematuria, no urinary urgency/frequency, no discharge. Also complains of difficulty maintaining erections over the past few years. He previously took viagra, which helped however he stopped a few years ago and is not interested in starting medications again at this time. His erections are strong enough for penetrative sex. He only loses erection when he pulls out. Has morning erections. Not currently sexually active due to penile discomfort.\par \par Of note, he is leaving the country this Thursday and will be gone for 1-2 months.

## 2021-02-23 NOTE — PHYSICAL EXAM
[General Appearance - Well Developed] : well developed [General Appearance - Well Nourished] : well nourished [Normal Appearance] : normal appearance [Well Groomed] : well groomed [General Appearance - In No Acute Distress] : no acute distress [Edema] : no peripheral edema [Exaggerated Use Of Accessory Muscles For Inspiration] : no accessory muscle use [Respiration, Rhythm And Depth] : normal respiratory rhythm and effort [Abdomen Soft] : soft [Costovertebral Angle Tenderness] : no ~M costovertebral angle tenderness [Urethral Meatus] : meatus normal [Penis Abnormality] : normal uncircumcised penis [Urinary Bladder Findings] : the bladder was normal on palpation [Scrotum] : the scrotum was normal [Testes Tenderness] : no tenderness of the testes [Testes Mass (___cm)] : there were no testicular masses [Normal Station and Gait] : the gait and station were normal for the patient's age [] : no rash [No Focal Deficits] : no focal deficits [Oriented To Time, Place, And Person] : oriented to person, place, and time [Affect] : the affect was normal [Mood] : the mood was normal [Not Anxious] : not anxious [FreeTextEntry1] : small area of erythema near frenulum, foreskin easy to retract/replace; supervised by Dr. Murillo

## 2021-02-23 NOTE — ASSESSMENT
[FreeTextEntry1] : 35 year old man presents with penile discomfort and ED.\par Testosterone, LH/FSH reviewed, all WNL\par \par -discussed medical management of ED, patient prefers to defer treatment at this time and will revisit ED concerns after [place]\par -discussed importance of hygiene and keeping area under foreskin clean and dry, recommended ammens powder to be applied after shower to glans/under foreskin\par -Rx for nystatin cream sent to pharmacy in case fungal symptoms return while [place] is out of country, stressed importance of follow up while abroad should symptoms return/worsen\par -dc triamcinolone\par -FU upon return to [place] in 1-2 months

## 2021-02-24 RX ORDER — LEVOTHYROXINE SODIUM 0.07 MG/1
75 TABLET ORAL DAILY
Qty: 1 | Refills: 1 | Status: ACTIVE | COMMUNITY
Start: 2020-11-13 | End: 1900-01-01

## 2021-04-05 ENCOUNTER — APPOINTMENT (OUTPATIENT)
Dept: ENDOCRINOLOGY | Facility: CLINIC | Age: 36
End: 2021-04-05

## 2021-05-17 ENCOUNTER — APPOINTMENT (OUTPATIENT)
Dept: UROLOGY | Facility: HOSPITAL | Age: 36
End: 2021-05-17

## 2021-06-28 ENCOUNTER — APPOINTMENT (OUTPATIENT)
Dept: ENDOCRINOLOGY | Facility: CLINIC | Age: 36
End: 2021-06-28

## 2021-07-14 NOTE — CONSULT NOTE ADULT - CONSULT REQUESTED BY NAME
CDU
Narcotic pain medicine is constipating , Buy over the counter stool softener and take as instructed on the bottle.

## 2021-07-21 ENCOUNTER — APPOINTMENT (OUTPATIENT)
Dept: ENDOCRINOLOGY | Facility: CLINIC | Age: 36
End: 2021-07-21
Payer: MEDICAID

## 2021-07-21 VITALS
SYSTOLIC BLOOD PRESSURE: 100 MMHG | OXYGEN SATURATION: 99 % | HEART RATE: 75 BPM | DIASTOLIC BLOOD PRESSURE: 80 MMHG | HEIGHT: 62 IN | BODY MASS INDEX: 26.68 KG/M2 | WEIGHT: 145 LBS | TEMPERATURE: 98 F

## 2021-07-21 PROCEDURE — 76536 US EXAM OF HEAD AND NECK: CPT | Mod: 52

## 2021-07-21 PROCEDURE — 99214 OFFICE O/P EST MOD 30 MIN: CPT | Mod: 25

## 2021-07-21 NOTE — ASSESSMENT
[FreeTextEntry1] : \par 36 year old male with hx of thyroid nodule and hyperthyroidism here for visit for hypothyroidism and thyroid nodule \par \par #Hypothyroidism \par -clinically with some symptoms of hypothyroidism (fatigue, cold intolerance)\par -Check TFTs \par -Continue Levothyroxine 75 mcg daily for now, will adjust as necessary \par \par #thyroid nodule- suspect subacute thyroiditis given hx of neck pain vs toxic nodule. Now off atenolol with HR at goal. Initial Thyroid US revealed right Solid isoechoic ill-defined noncalcified nodule in the mid to lower pole measures 1.6 x 1.6 x 1.9 cm. \par Patient was scheduled for FNA in December 2020 but noted to have a much smaller nodule in the right lower pole on US at that time: 0.78 x 0.68 x 0.6 cm, not meeting FNA criteria \par - US in office today showing stable right lower pole nodule: 0.53 x 0.85 x 1.02 cm. Does not meet criteria for FNA\par - will continue to monitor with US\par \par Sexual dysfunction\par - LH, FSH, testosterone, prolactin levels wnl\par \par Dr. Bhatia (DO).\par Seen and DW Dr. Jacques\par \par RTC in 6 months

## 2021-07-21 NOTE — REVIEW OF SYSTEMS
[Fatigue] : fatigue [Cold Intolerance] : cold intolerance [Decreased Appetite] : appetite not decreased [Recent Weight Gain (___ Lbs)] : no recent weight gain [Recent Weight Loss (___ Lbs)] : no recent weight loss [Visual Field Defect] : no visual field defect [Dry Eyes] : no dryness [Dysphagia] : no dysphagia [Neck Pain] : no neck pain [Dysphonia] : no dysphonia [Nasal Congestion] : no nasal congestion [Chest Pain] : no chest pain [Slow Heart Rate] : heart rate is not slow [Palpitations] : no palpitations [Fast Heart Rate] : heart rate is not fast [Shortness Of Breath] : no shortness of breath [Cough] : no cough [Nausea] : no nausea [Constipation] : no constipation [Vomiting] : no vomiting [Diarrhea] : no diarrhea [Polyuria] : no polyuria [Hesistancy] : no hesitancy [Joint Pain] : no joint pain [Muscle Weakness] : no muscle weakness [Acanthosis] : no acanthosis  [Headaches] : no headaches [Dizziness] : no dizziness [Tremors] : no tremors [Pain/Numbness of Digits] : no pain/numbness of digits [Depression] : no depression [Polydipsia] : no polydipsia [Easy Bleeding] : no ~M tendency for easy bleeding [Easy Bruising] : no tendency for easy bruising

## 2021-07-21 NOTE — HISTORY OF PRESENT ILLNESS
[FreeTextEntry1] : History of Present Illness\par ========================================================================================\par 36 year old male with hx of thyroid nodule and hyperthyroidism here for follow up visit for hyperthyroidism. \par Patient reported that when he was in [facility] (~October 2020), he notices that he had constant anterior neck pain, sharp and worsened with swallowing and palpation.\par He saw multiple providers in [facility] and was given antibiotics and steroids (multiple times). \par He also had a neck ultrasound there and was found to have bilateral thyroid nodules.\par [place] the [place] continued to [place]. He went to ED and was found to have a TSH of 0.1 and FT4 of 3.0. He was found to have Solid isoechoic ill-defined noncalcified nodule in the mid to lower pole measures 1.6 x 1.6 x 1.9 cm on thyroid US. \par TSI and TSH receptor ab negative. TPO and Tg ab negative. Covid ab test negative. \par Discharged on atenolol 12.5 mg qday. He used it for a few days but after neck pain resolved, he stopped using it. \par Occasional palpitations. No chest pain or sob. No weight changes, bowel changes, temperature intolerance, or tremors. No hair or skin changes. \par No neck pain, dysphagia, or dysphonia. \par \par \par Thyroid ultrasound showing:\par The thyroid gland is mildly heterogeneous in echogenicity.\par Right Lobe: Enlarged measuring 4.1 x 2.2 x 2.1 cm. Solid isoechoic ill-defined\par noncalcified nodule in the mid to lower pole measures 1.6 x 1.6 x 1.9 cm.\par Left Lobe: 2.9 x 1.3 x 0.9 cm. No discrete thyroid nodules are identified.\par Isthmus: 4 mm.\par Cervical Lymph Nodes: No enlarged or abnormal morphology cervical nodes.\par IMPRESSION:\par \par Solid right thyroid nodule measuring up to 1.9 cm.\par \par \par In the interim, \par \par Noted to have TFTs consistent with hypothyroidism. Initiated on Levothyroxine 75 mcg daily in November 2021\par No overt hypothyroid symptoms at this time \par He reported having sexual dysfunction such as erectile dysfunction frequently \par \par Pt came in for FNA of right thyroid nodule in December 2020 but noted to have a much smaller nodule in the right lower pole measuring 0.78 x 0.68 x 0.6 cm, not meeting FNA criteria.\par \par Today pt reports feeling fatigued with low energy and admits to feeling more cold than usual. Otherwise denies weight changes, change in bowel habits, hair/skin/nail changes, feeling depressed. Denies palpitations, tremors. He reports taking Levothyroxine 75 mcg daily every morning, taking correctly 1 hr prior to food or other medications. Does not take vitamins. Denies dysphagia, dysphonia, SOB.\par \par Still reporting erectile dysfunction. \par

## 2021-07-24 LAB
T4 FREE SERPL-MCNC: 1.7 NG/DL
TSH SERPL-ACNC: 0.69 UIU/ML

## 2021-08-27 NOTE — ED ADULT TRIAGE NOTE - PAIN: PRESENCE, MLM
-- DO NOT REPLY / DO NOT REPLY ALL --  -- Message is from the [facility]--    Referral Request  Name of Specialist: colonoscopy   Provider's specialty: Gastroenterology    Medical condition for referral:  Patient is looking to get referral for a gastroenterologist to have colonoscopy done    Is this a NEW request?: yes      Referral ordered by: Myles Ortiz      Insurance type:       Payor: [employer] [place] / Plan: PPO [id number] / Product Type: PPO MISC      Preferred Delivery Method   Call when ready for pickup - phone number to notify: [phone]    Caller Information       Type Contact Phone    08/27/2021 02:12 PM CDT Phone (Incoming) Burke Estrella (Self) [phone] (M)          Alternative phone number: none    Turnaround time given to caller:   \"This message will be sent to [state Provider's full name]. The clinical team will return your call as soon as they review your message. Typically, it takes 3 business days to process referral requests.\"  
complains of pain/discomfort

## 2022-01-05 ENCOUNTER — APPOINTMENT (OUTPATIENT)
Dept: ENDOCRINOLOGY | Facility: CLINIC | Age: 37
End: 2022-01-05

## 2022-07-27 RX ORDER — LEVOTHYROXINE SODIUM 0.07 MG/1
75 TABLET ORAL DAILY
Qty: 1 | Refills: 1 | Status: ACTIVE | COMMUNITY
Start: 2021-01-04 | End: 1900-01-01

## 2022-08-24 ENCOUNTER — APPOINTMENT (OUTPATIENT)
Dept: ENDOCRINOLOGY | Facility: CLINIC | Age: 37
End: 2022-08-24

## 2022-08-24 VITALS
DIASTOLIC BLOOD PRESSURE: 68 MMHG | WEIGHT: 135 LBS | HEART RATE: 57 BPM | TEMPERATURE: 97.9 F | BODY MASS INDEX: 24.84 KG/M2 | OXYGEN SATURATION: 99 % | HEIGHT: 62 IN | SYSTOLIC BLOOD PRESSURE: 92 MMHG

## 2022-08-24 PROCEDURE — 99214 OFFICE O/P EST MOD 30 MIN: CPT | Mod: 25

## 2022-08-24 PROCEDURE — 76536 US EXAM OF HEAD AND NECK: CPT

## 2022-08-24 NOTE — PHYSICAL EXAM
[Alert] : alert [No Acute Distress] : no acute distress [Normal Sclera/Conjunctiva] : normal sclera/conjunctiva [PERRL] : pupils equal, round and reactive to light [No Proptosis] : no proptosis [No Neck Mass] : no neck mass was observed [Supple] : the neck was supple [Thyroid Not Enlarged] : the thyroid was not enlarged [No Thyroid Nodules] : no palpable thyroid nodules [No Respiratory Distress] : no respiratory distress [Clear to Auscultation] : lungs were clear to auscultation bilaterally [Normal S1, S2] : normal S1 and S2 [Normal Rate] : heart rate was normal [Regular Rhythm] : with a regular rhythm [Normal Bowel Sounds] : normal bowel sounds [Not Tender] : non-tender [Not Distended] : not distended [Soft] : abdomen soft [No Clubbing, Cyanosis] : no clubbing  or cyanosis of the fingernails [No Involuntary Movements] : no involuntary movements were seen [No Joint Swelling] : no joint swelling seen [No Motor Deficits] : the motor exam was normal [No Tremors] : no tremors [Normal Affect] : the affect was normal [Normal Insight/Judgement] : insight and judgment were intact [Normal Mood] : the mood was normal

## 2022-08-24 NOTE — HISTORY OF PRESENT ILLNESS
[FreeTextEntry1] : History of Present Illness\par ========================================================================================\par 37 year old male with hx of thyroid nodule and hyperthyroidism here for follow up visit for hyperthyroidism. \par Patient reported that when he was in [facility] (~October 2020), he notices that he had constant anterior neck pain, sharp and worsened with swallowing and palpation.\par He saw multiple providers in [facility] and was given antibiotics and steroids (multiple times). \par He also had a neck ultrasound there and was found to have bilateral thyroid nodules.\par [place]. He went to ED and was found to have a TSH of 0.1 and FT4 of 3.0. He was found to have Solid isoechoic ill-defined noncalcified nodule in the mid to lower pole measures 1.6 x 1.6 x 1.9 cm on thyroid US. \par TSI and TSH receptor ab negative. TPO and Tg ab negative. Covid ab test negative. \par Discharged on atenolol 12.5 mg qday. He used it for a few days but after neck pain resolved, he stopped using it. \par Occasional palpitations. No chest pain or sob. No weight changes, bowel changes, temperature intolerance, or tremors. No hair or skin changes. \par No neck pain, dysphagia, or dysphonia. \par \par Thyroid ultrasound showing:\par The thyroid gland is mildly heterogeneous in echogenicity.\par Right Lobe: Enlarged measuring 4.1 x 2.2 x 2.1 cm. Solid isoechoic ill-defined\par noncalcified nodule in the mid to lower pole measures 1.6 x 1.6 x 1.9 cm.\par Left Lobe: 2.9 x 1.3 x 0.9 cm. No discrete thyroid nodules are identified.\par Isthmus: 4 mm.\par Cervical Lymph Nodes: No enlarged or abnormal morphology cervical nodes.\par IMPRESSION:\par \par Solid right thyroid nodule measuring up to 1.9 cm.\par \par Then, the patient was noted to have TFTs consistent with hypothyroidism. Initiated on Levothyroxine 75 mcg daily in November 2020\par No overt hypothyroid symptoms at this time \par He reported having sexual dysfunction such as erectile dysfunction frequently \par \par Pt came in for FNA of right thyroid nodule in December 2020 but noted to have a much smaller nodule in the right lower pole measuring 0.78 x 0.68 x 0.6 cm, not meeting FNA criteria.\par \par Visit 7/21, patient reported feeling fatigued with low energy and admits to feeling more cold than usual. Otherwise denied weight changes, change in bowel habits, hair/skin/nail changes, feeling depressed. Denies palpitations, tremors. He reports taking Levothyroxine 75 mcg daily every morning, taking correctly 1 hr prior to food or other medications. Does not take vitamins. Denies dysphagia, dysphonia, SOB.\par \par Still reporting erectile dysfunction. \par \par Labs 7/21: TSH 0.69, FT4 1.7\par \par 8/24/22\par The patient reports taking levothyroxine 75mcg daily 1 hour before meals without other medication. Denies taking supplements. The patient reports he had right hand surgery 1.5 months ago with pain medications he took for two dose. He reports occasional neck pain on the right or left side for a few seconds at a time. He reports energy levels are good, diarrhea, constipation, palpitations, chest pain, sob, dysphagia, dysphonia, neck enlargement, tremor, skin changes, nail changes, hair loss, erectile dysfunction.Thinks he may have lost some weight but he has been dieting (10lbs down from last visit), he reports something may be missing in terms of his heart beat occasionally. He reports mild cold intolerance, he reports he doesn't turn on his fan when it is hot out.\par

## 2022-08-24 NOTE — REVIEW OF SYSTEMS
[Recent Weight Loss (___ Lbs)] : recent weight loss: [unfilled] lbs [Fatigue] : no fatigue [Decreased Appetite] : appetite not decreased [Fever] : no fever [Chills] : no chills [Blurred Vision] : no blurred vision [Dysphagia] : no dysphagia [Neck Pain] : no neck pain [Dysphonia] : no dysphonia [Chest Pain] : no chest pain [Palpitations] : no palpitations [Lower Ext Edema] : no lower extremity edema [Shortness Of Breath] : no shortness of breath [Cough] : no cough [Nausea] : no nausea [Constipation] : no constipation [Abdominal Pain] : no abdominal pain [Vomiting] : no vomiting [Diarrhea] : no diarrhea [Muscle Weakness] : no muscle weakness [Myalgia] : no myalgia  [Dry Skin] : no dry skin [Hair Loss] : no hair loss [Headaches] : no headaches [Tremors] : no tremors [Cold Intolerance] : no cold intolerance [Heat Intolerance] : no heat intolerance [Swelling] : no swelling

## 2022-08-24 NOTE — ASSESSMENT
[FreeTextEntry1] : 37 year old male with hx of thyroid nodule and hyperthyroidism here for visit for hypothyroidism and thyroid nodule \par \par #Hypothyroidism \par -clinically euthyroid\par -Check TFTs \par -Continue Levothyroxine 75 mcg daily for now, will adjust as necessary \par \par #thyroid nodule- suspect subacute thyroiditis given hx of neck pain vs toxic nodule. Now off atenolol with HR at goal. Initial Thyroid US revealed right Solid isoechoic ill-defined noncalcified nodule in the mid to lower pole measures 1.6 x 1.6 x 1.9 cm. \par Patient was scheduled for FNA in December 2020 but noted to have a much smaller nodule in the right lower pole on US at that time: 0.78 x 0.68 x 0.6 cm, not meeting FNA criteria \par - US in office 7/21 showing stable right lower pole nodule: 0.53 x 0.85 x 1.02 cm. Does not meet criteria for FNA\par - US in office 8/22 showing stable right lower pole nodule: 0.77 x 0.6 x0.85 cm. Does not meet criteria for FNA.\par - will continue to monitor with US\par \par Sexual dysfunction, resolved\par - LH, FSH, testosterone, prolactin levels wnl\par \par RTC in 6 months\par \par Case d/w Dr. Jacques\par \par Samy Santoyo MD\par Endocrinology Fellow\par RTC in 6 months

## 2022-08-26 LAB
T4 FREE SERPL-MCNC: 1.6 NG/DL
TSH SERPL-ACNC: 0.54 UIU/ML

## 2022-11-15 DIAGNOSIS — E05.90 THYROTOXICOSIS, UNSPECIFIED W/OUT THYROTOXIC CRISIS OR STORM: ICD-10-CM

## 2023-03-01 ENCOUNTER — APPOINTMENT (OUTPATIENT)
Dept: ENDOCRINOLOGY | Facility: CLINIC | Age: 38
End: 2023-03-01

## 2024-03-22 ENCOUNTER — APPOINTMENT (OUTPATIENT)
Dept: ENDOCRINOLOGY | Facility: CLINIC | Age: 39
End: 2024-03-22
Payer: MEDICAID

## 2024-03-22 VITALS
BODY MASS INDEX: 25.42 KG/M2 | HEART RATE: 68 BPM | DIASTOLIC BLOOD PRESSURE: 75 MMHG | WEIGHT: 139 LBS | SYSTOLIC BLOOD PRESSURE: 112 MMHG

## 2024-03-22 DIAGNOSIS — E04.1 NONTOXIC SINGLE THYROID NODULE: ICD-10-CM

## 2024-03-22 DIAGNOSIS — E03.9 HYPOTHYROIDISM, UNSPECIFIED: ICD-10-CM

## 2024-03-22 DIAGNOSIS — Z00.00 ENCOUNTER FOR GENERAL ADULT MEDICAL EXAMINATION W/OUT ABNORMAL FINDINGS: ICD-10-CM

## 2024-03-22 DIAGNOSIS — N52.9 MALE ERECTILE DYSFUNCTION, UNSPECIFIED: ICD-10-CM

## 2024-03-22 PROCEDURE — 99214 OFFICE O/P EST MOD 30 MIN: CPT

## 2024-03-22 NOTE — REVIEW OF SYSTEMS
[Fatigue] : fatigue [Decreased Appetite] : appetite not decreased [Recent Weight Gain (___ Lbs)] : no recent weight gain [Recent Weight Loss (___ Lbs)] : no recent weight loss [Visual Field Defect] : no visual field defect [Dry Eyes] : no dryness [Dysphagia] : no dysphagia [Neck Pain] : no neck pain [Dysphonia] : no dysphonia [Nasal Congestion] : no nasal congestion [Chest Pain] : no chest pain [Slow Heart Rate] : heart rate is not slow [Palpitations] : no palpitations [Fast Heart Rate] : heart rate is not fast [Shortness Of Breath] : no shortness of breath [Cough] : no cough [Nausea] : no nausea [Constipation] : no constipation [Vomiting] : no vomiting [Diarrhea] : no diarrhea [Polyuria] : no polyuria [Hesistancy] : no hesitancy [Joint Pain] : no joint pain [Muscle Weakness] : no muscle weakness [Acanthosis] : no acanthosis  [Acne] : no acne [Headaches] : no headaches [Dizziness] : no dizziness [Tremors] : no tremors [Pain/Numbness of Digits] : no pain/numbness of digits [Depression] : no depression [Polydipsia] : no polydipsia [Easy Bleeding] : no ~M tendency for easy bleeding [Cold Intolerance] : no cold intolerance [Easy Bruising] : no tendency for easy bruising

## 2024-03-22 NOTE — HISTORY OF PRESENT ILLNESS
[FreeTextEntry1] : History of Present Illness ======================================================================================== 39-year-old male with hx of thyroid nodule and hyperthyroidism here for follow up visit for hyperthyroidism.  Initially had been seen for thyroiditis and right sided thyroid nodule in the hospital. This has resolved.  Overtime, thyroid nodule size had been declining so the patient  ----------------------------------------------------------------------------------------------------------------------------  Thyroid ultrasound showing: The thyroid gland is mildly heterogeneous in echogenicity. Right Lobe: Enlarged measuring 4.1 x 2.2 x 2.1 cm. Solid isoechoic ill-defined noncalcified nodule in the mid to lower pole measures 1.6 x 1.6 x 1.9 cm. Left Lobe: 2.9 x 1.3 x 0.9 cm. No discrete thyroid nodules are identified. Isthmus: 4 mm. Cervical Lymph Nodes: No enlarged or abnormal morphology cervical nodes. IMPRESSION:  Solid right thyroid nodule measuring up to 1.9 cm.   In the interim,   Noted to have TFTs consistent with hypothyroidism. Initiated on Levothyroxine 75 mcg daily  No overt hypothyroid symptoms at this time  He reported having sexual dysfunction such as erectile dysfunction frequently   In the interim,  He has been having some upper respiratory infections- treated with antibiotics  ( Was in [facility] for prolonged period of time)   Now has decreased it to Levothyroxine 50 mcg daily In [facility] 5 months ago   Symptoms:  -Increased fatigue  -Normal bowel movements ( Fluctuating)  -No hair loss  -No tremors or HP  -Stable weight  -Brain Fog +

## 2024-03-22 NOTE — ASSESSMENT
[FreeTextEntry1] : 39 year old male with hx of thyroid nodule and hyperthyroidism here for visit for hypothyroidism and thyroid nodule   #Hypothyroidism  -clinically euthyroid -Check TFTs  -Continue Levothyroxine 50 mcg daily for now, will adjust as necessary   #thyroid nodule- suspect subacute thyroiditis given hx of neck pain vs toxic nodule. Now off atenolol with HR at goal. Initial Thyroid US revealed right Solid isoechoic ill-defined noncalcified nodule in the mid to lower pole measures 1.6 x 1.6 x 1.9 cm.  Patient was scheduled for FNA in December 2020 but noted to have a much smaller nodule in the right lower pole on US at that time: 0.78 x 0.68 x 0.6 cm, not meeting FNA criteria  - US in office 7/21 showing stable right lower pole nodule: 0.53 x 0.85 x 1.02 cm. Does not meet criteria for FNA - US in office 8/22 showing stable right lower pole nodule: 0.77 x 0.6 x0.85 cm. Does not meet criteria for FNA. - US today showing 4 mm RLL pole nodule- not meeting FNA critieria   Sexual dysfunction: -Check LH, FSH, testosterone, prolactin levels   RTC in 6 months

## 2024-03-22 NOTE — PHYSICAL EXAM
[Alert] : alert [Well Nourished] : well nourished [No Acute Distress] : no acute distress [Normal Sclera/Conjunctiva] : normal sclera/conjunctiva [EOMI] : extra ocular movement intact [PERRL] : pupils equal, round and reactive to light [Normal Outer Ear/Nose] : the ears and nose were normal in appearance [Normal TMs] : both tympanic membranes were normal [No Neck Mass] : no neck mass was observed [Thyroid Not Enlarged] : the thyroid was not enlarged [No Respiratory Distress] : no respiratory distress [Clear to Auscultation] : lungs were clear to auscultation bilaterally [Normal S1, S2] : normal S1 and S2 [Normal Rate] : heart rate was normal [Regular Rhythm] : with a regular rhythm [Normal Bowel Sounds] : normal bowel sounds [Not Tender] : non-tender [Soft] : abdomen soft [Normal Gait] : normal gait [No Clubbing, Cyanosis] : no clubbing  or cyanosis of the fingernails [No Joint Swelling] : no joint swelling seen [No Rash] : no rash [No Skin Lesions] : no skin lesions [Oriented x3] : oriented to person, place, and time [Normal Affect] : the affect was normal [Normal Insight/Judgement] : insight and judgment were intact

## 2024-03-28 ENCOUNTER — NON-APPOINTMENT (OUTPATIENT)
Age: 39
End: 2024-03-28

## 2024-04-03 LAB
ALBUMIN SERPL ELPH-MCNC: 4.7 G/DL
ALP BLD-CCNC: 79 U/L
ALT SERPL-CCNC: 21 U/L
ANION GAP SERPL CALC-SCNC: 15 MMOL/L
AST SERPL-CCNC: 20 U/L
BILIRUB SERPL-MCNC: 0.3 MG/DL
BUN SERPL-MCNC: 9 MG/DL
CALCIUM SERPL-MCNC: 9.4 MG/DL
CHLORIDE SERPL-SCNC: 101 MMOL/L
CO2 SERPL-SCNC: 22 MMOL/L
CREAT SERPL-MCNC: 0.79 MG/DL
EGFR: 117 ML/MIN/1.73M2
ESTIMATED AVERAGE GLUCOSE: 108 MG/DL
ESTRADIOL SERPL-MCNC: 41 PG/ML
FSH SERPL-MCNC: 2.4 IU/L
GLUCOSE SERPL-MCNC: 85 MG/DL
HBA1C MFR BLD HPLC: 5.4 %
LH SERPL-ACNC: 3.6 IU/L
POTASSIUM SERPL-SCNC: 4.4 MMOL/L
PROLACTIN SERPL-MCNC: 9.7 NG/ML
PROT SERPL-MCNC: 7.6 G/DL
PSA FREE FLD-MCNC: 25 %
PSA FREE SERPL-MCNC: 0.22 NG/ML
PSA SERPL-MCNC: 0.86 NG/ML
SODIUM SERPL-SCNC: 138 MMOL/L
T4 FREE SERPL-MCNC: 1.5 NG/DL
TESTOST FREE SERPL-MCNC: 3.6 PG/ML
TESTOST SERPL-MCNC: 762 NG/DL
TSH SERPL-ACNC: 1.94 UIU/ML

## 2024-09-06 ENCOUNTER — APPOINTMENT (OUTPATIENT)
Dept: ENDOCRINOLOGY | Facility: CLINIC | Age: 39
End: 2024-09-06
Payer: MEDICAID

## 2024-09-06 ENCOUNTER — TRANSCRIPTION ENCOUNTER (OUTPATIENT)
Age: 39
End: 2024-09-06

## 2024-09-06 VITALS
DIASTOLIC BLOOD PRESSURE: 71 MMHG | WEIGHT: 135 LBS | HEART RATE: 76 BPM | HEIGHT: 62 IN | SYSTOLIC BLOOD PRESSURE: 113 MMHG | BODY MASS INDEX: 24.84 KG/M2

## 2024-09-06 DIAGNOSIS — E04.1 NONTOXIC SINGLE THYROID NODULE: ICD-10-CM

## 2024-09-06 DIAGNOSIS — Z00.00 ENCOUNTER FOR GENERAL ADULT MEDICAL EXAMINATION W/OUT ABNORMAL FINDINGS: ICD-10-CM

## 2024-09-06 DIAGNOSIS — E03.9 HYPOTHYROIDISM, UNSPECIFIED: ICD-10-CM

## 2024-09-06 PROCEDURE — G2211 COMPLEX E/M VISIT ADD ON: CPT | Mod: NC

## 2024-09-06 PROCEDURE — 99214 OFFICE O/P EST MOD 30 MIN: CPT

## 2024-09-06 NOTE — PHYSICAL EXAM
[Alert] : alert [Well Nourished] : well nourished [No Acute Distress] : no acute distress [Normal Sclera/Conjunctiva] : normal sclera/conjunctiva [PERRL] : pupils equal, round and reactive to light [Normal Outer Ear/Nose] : the ears and nose were normal in appearance [Normal TMs] : both tympanic membranes were normal [No Neck Mass] : no neck mass was observed [Thyroid Not Enlarged] : the thyroid was not enlarged [No Respiratory Distress] : no respiratory distress [Clear to Auscultation] : lungs were clear to auscultation bilaterally [Normal S1, S2] : normal S1 and S2 [Normal Rate] : heart rate was normal [Regular Rhythm] : with a regular rhythm [Normal Bowel Sounds] : normal bowel sounds [Not Tender] : non-tender [Soft] : abdomen soft [Normal Gait] : normal gait [No Joint Swelling] : no joint swelling seen [No Rash] : no rash [Oriented x3] : oriented to person, place, and time [Normal Insight/Judgement] : insight and judgment were intact

## 2024-09-06 NOTE — ASSESSMENT
[FreeTextEntry1] : 39 year old male with hx of thyroid nodule and hyperthyroidism here for visit for hypothyroidism and thyroid nodule   #Hypothyroidism  -clinically euthyroid -Check TFTs  -Continue Levothyroxine 50 mcg daily for now, will adjust as necessary   #thyroid nodule- suspect subacute thyroiditis given hx of neck pain vs toxic nodule. Now off atenolol with HR at goal. Initial Thyroid US revealed right Solid isoechoic ill-defined noncalcified nodule in the mid to lower pole measures 1.6 x 1.6 x 1.9 cm.  Patient was scheduled for FNA in December 2020 but noted to have a much smaller nodule in the right lower pole on US at that time: 0.78 x 0.68 x 0.6 cm, not meeting FNA criteria  - US in office 7/21 showing stable right lower pole nodule: 0.53 x 0.85 x 1.02 cm. Does not meet criteria for FNA - US in office 8/22 showing stable right lower pole nodule: 0.77 x 0.6 x0.85 cm. Does not meet criteria for FNA. - US (03/2024) showing 4 mm RLL pole nodule (03/22)- not meeting FNA criteria   HCM -Check CMP, HgA1C, lipid panel   RTC in 6 months

## 2024-09-06 NOTE — REVIEW OF SYSTEMS
[Fatigue] : no fatigue [Decreased Appetite] : appetite not decreased [Visual Field Defect] : no visual field defect [Dysphagia] : no dysphagia [Dysphonia] : no dysphonia [Chest Pain] : no chest pain [Palpitations] : no palpitations [Shortness Of Breath] : no shortness of breath [Nausea] : no nausea [Vomiting] : no vomiting [Polyuria] : no polyuria [Hesistancy] : no hesitancy [Joint Pain] : no joint pain [Acanthosis] : no acanthosis  [Headaches] : no headaches [Pain/Numbness of Digits] : no pain/numbness of digits [Depression] : no depression [Polydipsia] : no polydipsia [Cold Intolerance] : no cold intolerance [Easy Bleeding] : no ~M tendency for easy bleeding [Easy Bruising] : no tendency for easy bruising

## 2024-09-06 NOTE — HISTORY OF PRESENT ILLNESS
[FreeTextEntry1] : History of Present Illness ======================================================================================== 39-year-old male with hx of thyroid nodule and hyperthyroidism here for follow up visit for hyperthyroidism.  Initially had been seen for thyroiditis and right sided thyroid nodule in the hospital. This has resolved.  Overtime, thyroid nodule size had been declining so the patient  ----------------------------------------------------------------------------------------------------------------------------  Thyroid ultrasound showing: The thyroid gland is mildly heterogeneous in echogenicity. Right Lobe: Enlarged measuring 4.1 x 2.2 x 2.1 cm. Solid isoechoic ill-defined noncalcified nodule in the mid to lower pole measures 1.6 x 1.6 x 1.9 cm. Left Lobe: 2.9 x 1.3 x 0.9 cm. No discrete thyroid nodules are identified. Isthmus: 4 mm. Cervical Lymph Nodes: No enlarged or abnormal morphology cervical nodes. IMPRESSION:  Solid right thyroid nodule measuring up to 1.9 cm.   In the interim,   Noted to have TFTs consistent with hypothyroidism. Initiated on Levothyroxine 75 mcg daily  No overt hypothyroid symptoms at this time  He reported having sexual dysfunction such as erectile dysfunction frequently   In the interim,  He has been having some upper respiratory infections- treated with antibiotics  ( Was in [facility] for prolonged period of time)   Now has decreased it to Levothyroxine 50 mcg daily In [facility] 5 months ago   Symptoms:  -Increased fatigue  -Normal bowel movements ( Fluctuating)  -No hair loss  -No tremors or HP  -Stable weight  -Brain Fog +

## 2024-09-12 LAB
ALBUMIN SERPL ELPH-MCNC: 4.6 G/DL
ALP BLD-CCNC: 113 U/L
ALT SERPL-CCNC: 21 U/L
ANION GAP SERPL CALC-SCNC: 14 MMOL/L
AST SERPL-CCNC: 26 U/L
BILIRUB SERPL-MCNC: 0.7 MG/DL
BUN SERPL-MCNC: 5 MG/DL
CALCIUM SERPL-MCNC: 9.4 MG/DL
CHLORIDE SERPL-SCNC: 95 MMOL/L
CHOLEST SERPL-MCNC: 235 MG/DL
CO2 SERPL-SCNC: 21 MMOL/L
CREAT SERPL-MCNC: 0.75 MG/DL
EGFR: 119 ML/MIN/1.73M2
ESTIMATED AVERAGE GLUCOSE: 111 MG/DL
GLUCOSE SERPL-MCNC: 86 MG/DL
HBA1C MFR BLD HPLC: 5.5 %
HDLC SERPL-MCNC: 59 MG/DL
LDLC SERPL CALC-MCNC: 167 MG/DL
NONHDLC SERPL-MCNC: 176 MG/DL
POTASSIUM SERPL-SCNC: 5.2 MMOL/L
PROT SERPL-MCNC: 7.1 G/DL
SODIUM SERPL-SCNC: 130 MMOL/L
T4 FREE SERPL-MCNC: 1.3 NG/DL
TRIGL SERPL-MCNC: 52 MG/DL
TSH SERPL-ACNC: 1.81 UIU/ML

## 2024-09-20 ENCOUNTER — NON-APPOINTMENT (OUTPATIENT)
Age: 39
End: 2024-09-20

## 2024-09-20 ENCOUNTER — APPOINTMENT (OUTPATIENT)
Dept: ORTHOPEDIC SURGERY | Facility: CLINIC | Age: 39
End: 2024-09-20
Payer: MEDICAID

## 2024-09-20 VITALS — WEIGHT: 133 LBS | HEIGHT: 63 IN | BODY MASS INDEX: 23.57 KG/M2

## 2024-09-20 DIAGNOSIS — S43.402A UNSPECIFIED SPRAIN OF LEFT SHOULDER JOINT, INITIAL ENCOUNTER: ICD-10-CM

## 2024-09-20 DIAGNOSIS — M25.512 PAIN IN LEFT SHOULDER: ICD-10-CM

## 2024-09-20 PROCEDURE — 99204 OFFICE O/P NEW MOD 45 MIN: CPT | Mod: 25

## 2024-09-20 PROCEDURE — 73030 X-RAY EXAM OF SHOULDER: CPT | Mod: LT

## 2024-09-21 PROBLEM — S43.402A SPRAIN OF LEFT SHOULDER, UNSPECIFIED SHOULDER SPRAIN TYPE, INITIAL ENCOUNTER: Status: ACTIVE | Noted: 2024-09-21

## 2024-09-23 ENCOUNTER — APPOINTMENT (OUTPATIENT)
Dept: ORTHOPEDIC SURGERY | Facility: CLINIC | Age: 39
End: 2024-09-23

## 2025-02-12 ENCOUNTER — APPOINTMENT (OUTPATIENT)
Dept: PODIATRY | Facility: CLINIC | Age: 40
End: 2025-02-12

## 2025-03-06 ENCOUNTER — APPOINTMENT (OUTPATIENT)
Dept: ENDOCRINOLOGY | Facility: CLINIC | Age: 40
End: 2025-03-06

## 2025-05-21 ENCOUNTER — APPOINTMENT (OUTPATIENT)
Dept: ORTHOPEDIC SURGERY | Facility: CLINIC | Age: 40
End: 2025-05-21